# Patient Record
Sex: FEMALE | Race: BLACK OR AFRICAN AMERICAN | NOT HISPANIC OR LATINO | URBAN - METROPOLITAN AREA
[De-identification: names, ages, dates, MRNs, and addresses within clinical notes are randomized per-mention and may not be internally consistent; named-entity substitution may affect disease eponyms.]

---

## 2017-08-17 ENCOUNTER — OFFICE (OUTPATIENT)
Dept: URBAN - METROPOLITAN AREA CLINIC 11 | Facility: CLINIC | Age: 47
End: 2017-08-17
Payer: COMMERCIAL

## 2017-08-17 VITALS
HEART RATE: 80 BPM | DIASTOLIC BLOOD PRESSURE: 85 MMHG | HEIGHT: 62 IN | WEIGHT: 168 LBS | SYSTOLIC BLOOD PRESSURE: 129 MMHG

## 2017-08-17 DIAGNOSIS — R16.0 HEPATOMEGALY, NOT ELSEWHERE CLASSIFIED: ICD-10-CM

## 2017-08-17 DIAGNOSIS — R19.5 OTHER FECAL ABNORMALITIES: ICD-10-CM

## 2017-08-17 DIAGNOSIS — D50.9 IRON DEFICIENCY ANEMIA, UNSPECIFIED: ICD-10-CM

## 2017-08-17 PROCEDURE — 99214 OFFICE O/P EST MOD 30 MIN: CPT | Performed by: INTERNAL MEDICINE

## 2017-08-17 PROCEDURE — 36415 COLL VENOUS BLD VENIPUNCTURE: CPT | Performed by: INTERNAL MEDICINE

## 2017-08-17 NOTE — SERVICENOTES
We discussed her issues with the heme positive stools and discussed a dif dx including colon polyps, cancers, ulcer disease, gastritis, varices, vascular issues, etc... and her hematoma.  I would do the EGD and colon as we discussed.  We reviewed the history of the hepatic cysts/hematoma and questions about FNH/adenoma as well as the more recent subcapsular hematoma.  We discussed that it unusual for these lesions to bleed a such and that I would like to further characterize them, as such, with concern for malignant transformation. I will add an MRI and labs to her eval.

## 2017-08-17 NOTE — SERVICEHPINOTES
She was referred by Dr. Parra for issues with an iron def anemia and heme positive stools.  She has not been seeing blood in her stools.  She has not had n/v recently but did have issues with nausea after eating in the past.  She has not had nose bleeding, vaginal bleeding, hematuria, or hematemesis.  She has not had hemoptysis.  She has not had not had SOB or CP issues.  She has not had a colonoscopy or EGD.  She had an ugi years ago for GERD. She has a history of a hepatic cysts and hemagiomas with a possible adenoma vs FNH.  These lesions have been stable for a couple of years.  Several months ago she had a subcapsular bleed of the left hepatic lobe and was treated at [facility] [place]. Her LFTS were abnormal in March and there was not a f/u set in [facility].  She has not had tumor markers.  She had a hysterectomy about 2-3 years ago and is not on hormonal replacement. The [facility] chart was noted. BR

## 2017-08-18 LAB
ALPHA-FETOPROTEIN TUMOR MARKER: AFP - TUMOR MARKER: 6.1 NG/ML (ref 0–9)
CA 19-9: CA199 (ROCHE): 8 U/ML
CEA: 2.2 NG/ML
HEPATIC FUNCTION PANEL A: ALBUMIN: 4.5 G/DL (ref 3.5–5.2)
HEPATIC FUNCTION PANEL A: ALKALINE PHOSPHATASE: 149 U/L — HIGH (ref 38–103)
HEPATIC FUNCTION PANEL A: DIRECT BILIRUBIN: <0.1 MG/DL
HEPATIC FUNCTION PANEL A: SGOT (AST): 22 U/L (ref 13–40)
HEPATIC FUNCTION PANEL A: SGPT (ALT): 28 U/L (ref 7–52)
HEPATIC FUNCTION PANEL A: TOTAL BILIRUBIN: 0.2 MG/DL — LOW (ref 0.3–1.2)
HEPATIC FUNCTION PANEL A: TOTAL PROTEIN: 8.1 G/DL (ref 6.4–8.3)

## 2017-09-23 ENCOUNTER — AMBULATORY SURGICAL CENTER (OUTPATIENT)
Dept: URBAN - METROPOLITAN AREA SURGERY 2 | Facility: SURGERY | Age: 47
End: 2017-09-23
Payer: COMMERCIAL

## 2017-09-23 VITALS
SYSTOLIC BLOOD PRESSURE: 125 MMHG | WEIGHT: 170 LBS | DIASTOLIC BLOOD PRESSURE: 73 MMHG | SYSTOLIC BLOOD PRESSURE: 128 MMHG | DIASTOLIC BLOOD PRESSURE: 70 MMHG | HEART RATE: 93 BPM | SYSTOLIC BLOOD PRESSURE: 115 MMHG | RESPIRATION RATE: 16 BRPM | HEART RATE: 88 BPM | RESPIRATION RATE: 14 BRPM | OXYGEN SATURATION: 100 % | DIASTOLIC BLOOD PRESSURE: 70 MMHG | HEIGHT: 62 IN | HEART RATE: 93 BPM | SYSTOLIC BLOOD PRESSURE: 128 MMHG | HEART RATE: 95 BPM | TEMPERATURE: 97.8 F | DIASTOLIC BLOOD PRESSURE: 73 MMHG | SYSTOLIC BLOOD PRESSURE: 118 MMHG | OXYGEN SATURATION: 100 % | SYSTOLIC BLOOD PRESSURE: 115 MMHG | SYSTOLIC BLOOD PRESSURE: 125 MMHG | RESPIRATION RATE: 14 BRPM | HEIGHT: 62 IN | SYSTOLIC BLOOD PRESSURE: 118 MMHG | TEMPERATURE: 97.6 F | HEART RATE: 95 BPM | TEMPERATURE: 97.6 F | DIASTOLIC BLOOD PRESSURE: 75 MMHG | RESPIRATION RATE: 16 BRPM | TEMPERATURE: 97.8 F | WEIGHT: 170 LBS | HEART RATE: 88 BPM | DIASTOLIC BLOOD PRESSURE: 75 MMHG

## 2017-09-23 DIAGNOSIS — B96.81 HELICOBACTER PYLORI [H. PYLORI] AS THE CAUSE OF DISEASES CLA: ICD-10-CM

## 2017-09-23 DIAGNOSIS — K29.50 UNSPECIFIED CHRONIC GASTRITIS WITHOUT BLEEDING: ICD-10-CM

## 2017-09-23 DIAGNOSIS — K22.10 ULCER OF ESOPHAGUS WITHOUT BLEEDING: ICD-10-CM

## 2017-09-23 DIAGNOSIS — K64.8 OTHER HEMORRHOIDS: ICD-10-CM

## 2017-09-23 DIAGNOSIS — I10 ESSENTIAL (PRIMARY) HYPERTENSION: ICD-10-CM

## 2017-09-23 DIAGNOSIS — D50.9 IRON DEFICIENCY ANEMIA, UNSPECIFIED: ICD-10-CM

## 2017-09-23 DIAGNOSIS — R19.5 OTHER FECAL ABNORMALITIES: ICD-10-CM

## 2017-09-23 DIAGNOSIS — K63.5 POLYP OF COLON: ICD-10-CM

## 2017-09-23 DIAGNOSIS — K20.9 ESOPHAGITIS, UNSPECIFIED: ICD-10-CM

## 2017-09-23 PROBLEM — K29.70 GASTRITIS, UNSPECIFIED, WITHOUT BLEEDING: Status: ACTIVE | Noted: 2017-09-23

## 2017-09-23 PROBLEM — D12.5 BENIGN NEOPLASM OF SIGMOID COLON: Status: ACTIVE | Noted: 2017-09-23

## 2017-09-23 PROBLEM — K92.2 EVALUATION OF UNEXPLAINED GI BLEEDING: Status: ACTIVE | Noted: 2017-09-23

## 2017-09-23 PROBLEM — D53.9 UNEXPLAINED IRON DEFICIENCY ANEMIA: Status: ACTIVE | Noted: 2017-09-23

## 2017-09-23 PROCEDURE — 43239 EGD BIOPSY SINGLE/MULTIPLE: CPT | Mod: 51 | Performed by: INTERNAL MEDICINE

## 2017-09-23 PROCEDURE — 45380 COLONOSCOPY AND BIOPSY: CPT | Performed by: INTERNAL MEDICINE

## 2017-11-03 ENCOUNTER — OFFICE (OUTPATIENT)
Dept: URBAN - METROPOLITAN AREA CLINIC 11 | Facility: CLINIC | Age: 47
End: 2017-11-03

## 2017-11-03 VITALS
HEART RATE: 87 BPM | DIASTOLIC BLOOD PRESSURE: 88 MMHG | SYSTOLIC BLOOD PRESSURE: 134 MMHG | WEIGHT: 177 LBS | HEIGHT: 62 IN

## 2017-11-03 DIAGNOSIS — R19.09 OTHER INTRA-ABDOMINAL AND PELVIC SWELLING, MASS AND LUMP: ICD-10-CM

## 2017-11-03 DIAGNOSIS — B96.81 HELICOBACTER PYLORI [H. PYLORI] AS THE CAUSE OF DISEASES CLA: ICD-10-CM

## 2017-11-03 DIAGNOSIS — K21.9 GASTRO-ESOPHAGEAL REFLUX DISEASE WITHOUT ESOPHAGITIS: ICD-10-CM

## 2017-11-03 DIAGNOSIS — D50.9 IRON DEFICIENCY ANEMIA, UNSPECIFIED: ICD-10-CM

## 2017-11-03 PROCEDURE — 99213 OFFICE O/P EST LOW 20 MIN: CPT | Performed by: INTERNAL MEDICINE

## 2017-11-03 NOTE — SERVICENOTES
We discussed the H. pylori, clearance issues and f/u testing.  We reviewed her reflux issues and use of the PPIs until after the holidays.  We discussed the small bowel lesion, a dif dx inclduign lipomas/GIST/leiomyomas/etc..., and the evaluation by EUS.  We reviewed her hyperplastic colon polyp and f/u colon in 10 years.

## 2017-11-03 NOTE — SERVICEHPINOTES
"I've been doing good."  She stated that her reflux/abdomian pain ahve improved on the meds.  She has noted that caffiene causes issues with heartburn.   She has issues with tea and even decaf coffee.   She has completed her meds for H. pylori and did not like taking the number of pills.She stated that she has had recent labs with Dr. Parra her anemia has improved.

## 2017-11-09 ENCOUNTER — ON CAMPUS - OUTPATIENT (OUTPATIENT)
Dept: URBAN - METROPOLITAN AREA HOSPITAL 97 | Facility: HOSPITAL | Age: 47
End: 2017-11-09
Payer: COMMERCIAL

## 2017-11-09 DIAGNOSIS — K29.70 GASTRITIS, UNSPECIFIED, WITHOUT BLEEDING: ICD-10-CM

## 2017-11-09 DIAGNOSIS — B96.81 HELICOBACTER PYLORI [H. PYLORI] AS THE CAUSE OF DISEASES CLA: ICD-10-CM

## 2017-11-09 DIAGNOSIS — K44.9 DIAPHRAGMATIC HERNIA WITHOUT OBSTRUCTION OR GANGRENE: ICD-10-CM

## 2017-11-09 PROCEDURE — 43237 ENDOSCOPIC US EXAM ESOPH: CPT | Performed by: INTERNAL MEDICINE

## 2017-11-09 PROCEDURE — 43239 EGD BIOPSY SINGLE/MULTIPLE: CPT | Mod: 59 | Performed by: INTERNAL MEDICINE

## 2018-02-06 ENCOUNTER — OFFICE (OUTPATIENT)
Dept: URBAN - METROPOLITAN AREA CLINIC 11 | Facility: CLINIC | Age: 48
End: 2018-02-06

## 2018-02-06 VITALS
HEART RATE: 74 BPM | SYSTOLIC BLOOD PRESSURE: 157 MMHG | HEIGHT: 62 IN | WEIGHT: 180 LBS | DIASTOLIC BLOOD PRESSURE: 103 MMHG

## 2018-02-06 DIAGNOSIS — B96.81 HELICOBACTER PYLORI [H. PYLORI] AS THE CAUSE OF DISEASES CLA: ICD-10-CM

## 2018-02-06 DIAGNOSIS — R16.0 HEPATOMEGALY, NOT ELSEWHERE CLASSIFIED: ICD-10-CM

## 2018-02-06 DIAGNOSIS — K21.9 GASTRO-ESOPHAGEAL REFLUX DISEASE WITHOUT ESOPHAGITIS: ICD-10-CM

## 2018-02-06 DIAGNOSIS — K29.70 GASTRITIS, UNSPECIFIED, WITHOUT BLEEDING: ICD-10-CM

## 2018-02-06 PROCEDURE — 99213 OFFICE O/P EST LOW 20 MIN: CPT | Performed by: INTERNAL MEDICINE

## 2018-02-06 RX ORDER — PANTOPRAZOLE SODIUM 40 MG/1
TABLET, DELAYED RELEASE ORAL
Qty: 90 | Refills: 1 | Status: COMPLETED
Start: 2017-09-23 | End: 2018-02-06

## 2018-02-06 NOTE — SERVICEHPINOTES
She presents for f/u of her gastritis, H. pylori, hepatic adenoma, and normal EUS.  She has not had issues with reflux or abdominal pain.  She did have an erosion at the GE junction in the fall.  She has not had dysphagia issues or heartburn.  She is interested in stopping the Protonix. She was treated for H. pylori and her f/u colon was negative. She has had normal stools and no diarrhea or other other changes.  Her colonoscopy in 2017 was overall normal.She had a 2cm adenomatous appearing leion with a subcapsular hematoma last year.  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  We reviewed her photos, radiology reports, and path reports.

## 2018-02-06 NOTE — SERVICENOTES
We has been doing well and has not had issues with her gastritis/esophagitis (resolved), H. pylori (resolved), or GERD.   We can try her off of the Protonix.  We discussed her reports/EUS, and that the duodenal lesion may have been a temporarily disteded CBD just above the ampulla, which would not need further studies.  We discussed her subcapular bleed and FNH/adenoma.  She will need a f/u MRI at the 1 year ruth from the last MRI (August).

## 2018-06-29 ENCOUNTER — OFFICE (OUTPATIENT)
Dept: URBAN - METROPOLITAN AREA CLINIC 11 | Facility: CLINIC | Age: 48
End: 2018-06-29

## 2018-06-29 VITALS
HEART RATE: 82 BPM | SYSTOLIC BLOOD PRESSURE: 145 MMHG | DIASTOLIC BLOOD PRESSURE: 97 MMHG | WEIGHT: 182 LBS | HEIGHT: 62 IN

## 2018-06-29 DIAGNOSIS — D18.03 HEMANGIOMA OF INTRA-ABDOMINAL STRUCTURES: ICD-10-CM

## 2018-06-29 DIAGNOSIS — R16.0 HEPATOMEGALY, NOT ELSEWHERE CLASSIFIED: ICD-10-CM

## 2018-06-29 LAB
AFP, SERUM, TUMOR MARKER: 4.1 NG/ML (ref 0–8.3)
CA 19-9: 7 U/ML (ref 0–35)
HEPATIC FUNCTION PANEL (7): ALBUMIN: 4.1 G/DL (ref 3.5–5.5)
HEPATIC FUNCTION PANEL (7): ALKALINE PHOSPHATASE: 123 IU/L — HIGH (ref 39–117)
HEPATIC FUNCTION PANEL (7): ALT (SGPT): 16 IU/L (ref 0–32)
HEPATIC FUNCTION PANEL (7): AST (SGOT): 16 IU/L (ref 0–40)
HEPATIC FUNCTION PANEL (7): BILIRUBIN, DIRECT: 0.07 MG/DL (ref 0–0.4)
HEPATIC FUNCTION PANEL (7): BILIRUBIN, TOTAL: 0.2 MG/DL (ref 0–1.2)
HEPATIC FUNCTION PANEL (7): PROTEIN, TOTAL: 7.2 G/DL (ref 6–8.5)

## 2018-06-29 PROCEDURE — 99213 OFFICE O/P EST LOW 20 MIN: CPT | Performed by: INTERNAL MEDICINE

## 2018-06-29 NOTE — SERVICENOTES
We discussed her MRI results, prior dx of hemagiomas/cysts/possible FNH, some of the improvement noted on the recent MRI, and the two smaller lesions.  At this point, I agree with a f/u MRI in November.  If there is progression, we discussed a referral to [facility] for a liver surgery evaluation.  We also discussed her recent nausea/dyspepsia and prior to an EGD, I would like to restart her Protonix, which has worked in the past.

## 2018-06-29 NOTE — SERVICEHPINOTES
She had an MRI on 6/14 with findings of a 2/3 reduction of her prior large hemangioma, a smaller hemangioma, a couple of small cysts, and two area of possible FNH vs benign adenomas as noted in EPIC.  She was rec to have a f/u in about 6 mos.  She had the bleeding hemangioma with possible FNH last year which has been stable/improving sinceShe has had issues with some nausea after eating about 1-2 times a week.  This occurs after about a month off of the Protonix. She has noted tht it is an issue with burritos, some salads (with black beans/corn), and a random sandwhich.  She has had an u/s (which prompted the MRI) without acute findings.  She has not had issues with heartburn, dysphagia, no regurgitat. She has vomited twice.  This has improved some over the past few weeks.  She did restart her Protonix intermittently and it may have helped.